# Patient Record
Sex: FEMALE | Race: ASIAN | Employment: FULL TIME | URBAN - METROPOLITAN AREA
[De-identification: names, ages, dates, MRNs, and addresses within clinical notes are randomized per-mention and may not be internally consistent; named-entity substitution may affect disease eponyms.]

---

## 2017-03-12 ENCOUNTER — OFFICE VISIT (OUTPATIENT)
Dept: FAMILY MEDICINE CLINIC | Facility: CLINIC | Age: 67
End: 2017-03-12

## 2017-03-12 VITALS
OXYGEN SATURATION: 98 % | RESPIRATION RATE: 18 BRPM | SYSTOLIC BLOOD PRESSURE: 148 MMHG | HEART RATE: 78 BPM | TEMPERATURE: 98 F | DIASTOLIC BLOOD PRESSURE: 90 MMHG

## 2017-03-12 DIAGNOSIS — K12.0 CANKER SORES ORAL: Primary | ICD-10-CM

## 2017-03-12 DIAGNOSIS — IMO0001 COLD: ICD-10-CM

## 2017-03-12 PROCEDURE — 99203 OFFICE O/P NEW LOW 30 MIN: CPT

## 2017-03-12 RX ORDER — ROSUVASTATIN CALCIUM 10 MG/1
TABLET, COATED ORAL
Refills: 0 | COMMUNITY
Start: 2017-02-05

## 2017-03-12 RX ORDER — TRIAMCINOLONE ACETONIDE 0.1 %
PASTE (GRAM) DENTAL
Qty: 1 TUBE | Refills: 0 | Status: SHIPPED | OUTPATIENT
Start: 2017-03-12

## 2017-03-12 RX ORDER — LOSARTAN POTASSIUM 100 MG/1
TABLET ORAL
Refills: 1 | COMMUNITY
Start: 2017-02-05

## 2017-03-12 NOTE — PROGRESS NOTES
CHIEF COMPLAINT:   Patient presents with:  Mouth Cold Sores (respiratory/integumentary): for 5 days, under her left side of tongue.  Hx of cold sores  Lymph Node: tender left neck lymph node    HPI:   Batsheva Islas is a 61year old female who presents w HEAD: atraumatic, normocephalic  EYES: conjunctiva non-injected; no drainage  EARS: TM's dull bilat, no bulging, no retraction, no fluid, bony landmarks are not visible  NOSE: Nasal passages erythematous and swollen   THROAT/MOUTH: oral mucosa pink and seymour The exact cause of canker sores is not known, but they are linked to a number of conditions.  These include:  · An injury or irritation in the mouth, such as biting the inside of your cheek or braces rubbing  · Allergy or sensitivity to certain foods or sub Mouth sores that seem to be canker sores can be signs of a more serious illness. If you have other signs of illness along with mouth sores, you should talk with a healthcare provider.  Canker sores can be so painful that they interfere with talking, eating,

## 2017-03-12 NOTE — PATIENT INSTRUCTIONS
Understanding Canker Sores  Canker sores are small, painful sores inside the mouth. They occur most often on the tongue, gums, or insides of the cheeks. The medical term for canker sores is aphthous ulcers. What causes a canker sore?   The exact cause of · Try L-Lysine, an amino acid/partial protein to help prevent or treat the  Canker sore. Use one tablet every 2 hours the  First day, while awake. Then take as directed on  label. You can take one pill per day as prevention.   What are the compl

## 2017-05-12 ENCOUNTER — OFFICE VISIT (OUTPATIENT)
Dept: FAMILY MEDICINE CLINIC | Facility: CLINIC | Age: 67
End: 2017-05-12

## 2017-05-12 VITALS
SYSTOLIC BLOOD PRESSURE: 158 MMHG | HEART RATE: 98 BPM | HEIGHT: 62 IN | TEMPERATURE: 99 F | DIASTOLIC BLOOD PRESSURE: 88 MMHG | WEIGHT: 140 LBS | RESPIRATION RATE: 14 BRPM | BODY MASS INDEX: 25.76 KG/M2

## 2017-05-12 DIAGNOSIS — J02.9 SORE THROAT: ICD-10-CM

## 2017-05-12 DIAGNOSIS — J01.40 ACUTE NON-RECURRENT PANSINUSITIS: Primary | ICD-10-CM

## 2017-05-12 PROCEDURE — 99213 OFFICE O/P EST LOW 20 MIN: CPT | Performed by: NURSE PRACTITIONER

## 2017-05-12 PROCEDURE — 87081 CULTURE SCREEN ONLY: CPT | Performed by: NURSE PRACTITIONER

## 2017-05-12 PROCEDURE — 87880 STREP A ASSAY W/OPTIC: CPT | Performed by: NURSE PRACTITIONER

## 2017-05-12 RX ORDER — AMOXICILLIN AND CLAVULANATE POTASSIUM 875; 125 MG/1; MG/1
1 TABLET, FILM COATED ORAL 2 TIMES DAILY
Qty: 14 TABLET | Refills: 0 | Status: SHIPPED | OUTPATIENT
Start: 2017-05-12 | End: 2017-05-19

## 2017-05-12 NOTE — PROGRESS NOTES
CHIEF COMPLAINT:   Patient presents with:  URI      HPI:   Jannie Mishra is a 61year old female who presents with congestion and sore throat for 3 days. Onset was quick ,   Symptoms are progressing. Location is reported as mid face and throat.   Tera CARDIOVASCULAR: denies chest pain or palpitations. GI: denies N/V/C or abdominal pain  NEURO: + sinus headaches. No numbness, tingling in face, or dizziness.     EXAM:   /88 mmHg  Pulse 98  Temp(Src) 99 °F (37.2 °C) (Oral)  Resp 14  Ht 62\"  Wt 140 Patient instructed to consider OTC saline nasal spray per box instructions  Patient instructed to increase water intake and rest.    Meds & Refills for this Visit:    Signed Prescriptions Disp Refills    Amoxicillin-Pot Clavulanate 875-125 MG Oral Tab 14 t A test has been done to find out whether you (or your child, if your child is the patient) have strep throat. Call this facility or your healthcare provider if you were not given your test results.  If the test is positive for strep infection, you will need · Use throat lozenges or numbing throat sprays to help reduce pain. Gargling with warm salt water will also help reduce throat pain. For this, dissolve 1/2 teaspoon of salt in 1 glass of warm water.  To help soothe a sore throat, children can sip on juice o The sinuses are air-filled spaces within the bones of the face. They connect to the inside of the nose. Sinusitis is an inflammation of the tissue lining the sinus cavity. Sinus inflammation can occur during a cold.  It can also be due to allergies to polle · Do not use nasal rinses or irrigation during an acute sinus infection, unless told to by your health care provider. Rinsing may spread the infection to other sinuses.   · Use acetaminophen or ibuprofen to control pain, unless another pain medicine was pre · Learn to take your own blood pressure. Keep a record of your results. Ask your doctor which readings mean that you need medical attention. · Take your blood pressure medicine exactly as directed. Don’t skip doses.  Missing doses can cause your blood pres · Limit drinks that contain caffeine (coffee, black or green tea, cola) to 2 per day. · Never take stimulants such as amphetamines or cocaine; these drugs can be deadly for someone with high blood pressure. · Control your stress.  Learn stress-management

## 2017-05-12 NOTE — PATIENT INSTRUCTIONS
Pharyngitis (Sore Throat), Report Pending    Pharyngitis (sore throat) is often due to a virus. It can also be caused by the streptococcus, or strep, bacterium, often called strep throat.  Both viral and strep infections can cause throat pain that is wors · For children: Use acetaminophen for fever, fussiness, or discomfort.  In infants older than 10months of age, you may use ibuprofen instead of acetaminophen. Talk with your child's healthcare provider before giving these medicines if your child has chronic · Signs of dehydration (very dark urine or no urine, sunken eyes, dizziness)  · Trouble breathing or noisy breathing  · Muffled voice  · New rash  · Child appears to be getting sicker  Date Last Reviewed: 4/13/2015  © 7523-3094 The [address]. 7 · Over-the-counter decongestants may be used unless a similar medicine was prescribed. Nasal sprays work the fastest. Use one that contains phenylephrine or oxymetazoline. First blow the nose gently. Then use the spray.  Do not use these medicines more ofte © 0808-0599 The [address] [place] [address], [address]. All rights reserved. This information is not intended as a substitute for professional medical care. Always follow your healthcare professional's instructions.         Carlos Sophie ¨ The American Heart Association’s (AHA) \"ideal\" sodium intake recommendation is 1,500 milligrams per day.  However, since American's eat so much salt, the AHA says a positive change can occur by cutting back to even 2,400 milligrams of sodium a day.   · © 2590-1735 [address], [address] [place]. All rights reserved. This information is not intended as a substitute for professional medical care. Always follow your healthcare professional's instructions.

## (undated) NOTE — MR AVS SNAPSHOT
[address]  [phone]               Thank you for choosing us for your health care visit with MARSHALL Tucker.   We are glad to serve you and happy to provide you with this summary of your shortening outbreaks. Treatments may include:  · Prescription or over-the-counter skin treatments to apply to the sores.  Steroids for your skin (topical) may protect the canker sores from further irritation and allow them to heal. Topical pain relief medic professional's instructions.              Allergies as of Mar 12, 2017     Not on File                Today's Vital Signs     BP Pulse Temp             148/90 mmHg 78 98 °F (36.7 °C) (Oral)            Current Medications          This list is accurate as of Educational Information     Healthy Diet and Regular Exercise  [facility] [address] for making healthy food choices  -   Enjoy your food, but eat less. Fully enjoy your food when eating. Don’t eat while distracted and slow down.    Avoid

## (undated) NOTE — MR AVS SNAPSHOT
[address]  [phone]               Thank you for choosing us for your health care visit with Estela Gunderson NP.   We are glad to serve you and happy to provide you with this summary of first 2 days of taking the antibiotics. After this time, you will not be contagious. You can then return to work or school if you are feeling better.   · Take the antibiotic medicine for the full 10 days, even if you feel better.  This is very important to ¨ Your child is younger than 3years of age and has a fever of 100.4°F (38°C) that continues for more than 1 day. ¨ Your child is 3years old or older and has a fever of 100.4°F (38°C) that continues for more than 3 days.   · New or worsening ear pain, sin face. Using a vaporizer along with a menthol rub at night may also help.   · An expectorant containing guaifenesin may help thin the mucus and promote drainage from the sinuses.   · Over-the-counter decongestants may be used unless a similar medicine was pr · Symptoms not resolving within 10 days  Date Last Reviewed: 4/13/2015  © 2913-0586 The [address] [place] [address], [address] [place] [address]. All rights reserved. This information is not intended as a substitute for professional medical care.  A recommendation is 1,500 milligrams per day. Elizabeth Chung, since American's eat so much salt, the AHA says a positive change can occur by cutting back to even 2,400 milligrams of sodium a day.   · Follow the DASH (Dietary Approaches to Stop Hypertension) eating substitute for professional medical care. Always follow your healthcare professional's instructions.              Allergies as of May 12, 2017     Not on File                Today's Vital Signs     Pulse Temp Height Weight BMI    98 99 °F (37.2 °C) (Oral) 6 If you have questions, you can call [phone] to talk to our [facility] Staff. Remember, [email or web address] is NOT to be used for urgent needs. For medical emergencies, dial 911.            Visit [facility] online at  [email or web address]

## (undated) NOTE — Clinical Note
Date: 5/12/2017    Patient Name: Martina Altamirano          To Whom it may concern: This letter has been written at the patient's request. The above patient was seen at the [facility] for treatment of a medical condition.     This patient s